# Patient Record
Sex: FEMALE | Race: WHITE
[De-identification: names, ages, dates, MRNs, and addresses within clinical notes are randomized per-mention and may not be internally consistent; named-entity substitution may affect disease eponyms.]

---

## 2020-12-10 ENCOUNTER — HOSPITAL ENCOUNTER (EMERGENCY)
Dept: HOSPITAL 63 - ER | Age: 40
LOS: 1 days | Discharge: HOME | End: 2020-12-11
Payer: SELF-PAY

## 2020-12-10 VITALS — HEIGHT: 66 IN | WEIGHT: 220.02 LBS | BODY MASS INDEX: 35.36 KG/M2

## 2020-12-10 DIAGNOSIS — Z88.0: ICD-10-CM
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
## 2020-12-10 DIAGNOSIS — O26.893: Primary | ICD-10-CM

## 2020-12-10 DIAGNOSIS — Z3A.33: ICD-10-CM

## 2020-12-10 DIAGNOSIS — T78.49XA: ICD-10-CM

## 2020-12-10 DIAGNOSIS — L29.9: ICD-10-CM

## 2020-12-10 LAB
AMPHETAMINE/METHAMPHETAMINE: (no result)
APTT PPP: YELLOW S
BACTERIA #/AREA URNS HPF: 0 /HPF
BARBITURATES UR-MCNC: (no result) UG/ML
BENZODIAZ UR-MCNC: (no result) UG/L
BILIRUB UR QL STRIP: (no result)
CANNABINOIDS UR-MCNC: (no result) UG/L
COCAINE UR-MCNC: (no result) NG/ML
FIBRINOGEN PPP-MCNC: CLEAR MG/DL
GLUCOSE UR STRIP-MCNC: (no result) MG/DL
METHADONE SERPL-MCNC: (no result) NG/ML
NITRITE UR QL STRIP: (no result)
OPIATES UR-MCNC: (no result) NG/ML
PCP SERPL-MCNC: (no result) MG/DL
RBC #/AREA URNS HPF: 0 /HPF (ref 0–2)
SP GR UR STRIP: 1.02
SQUAMOUS #/AREA URNS LPF: (no result) /LPF
UROBILINOGEN UR-MCNC: 0.2 MG/DL
WBC #/AREA URNS HPF: (no result) /HPF (ref 0–4)

## 2020-12-10 PROCEDURE — 36415 COLL VENOUS BLD VENIPUNCTURE: CPT

## 2020-12-10 PROCEDURE — 99284 EMERGENCY DEPT VISIT MOD MDM: CPT

## 2020-12-10 PROCEDURE — 80307 DRUG TEST PRSMV CHEM ANLYZR: CPT

## 2020-12-10 PROCEDURE — 96374 THER/PROPH/DIAG INJ IV PUSH: CPT

## 2020-12-10 PROCEDURE — 87086 URINE CULTURE/COLONY COUNT: CPT

## 2020-12-10 PROCEDURE — 81001 URINALYSIS AUTO W/SCOPE: CPT

## 2020-12-10 PROCEDURE — 94640 AIRWAY INHALATION TREATMENT: CPT

## 2020-12-10 PROCEDURE — 96375 TX/PRO/DX INJ NEW DRUG ADDON: CPT

## 2020-12-10 RX ADMIN — ALBUTEROL SULFATE ONE PUFF: 90 AEROSOL, METERED RESPIRATORY (INHALATION) at 23:00

## 2020-12-10 NOTE — PHYS DOC
General Adult


HPI:


HPI:


".. I am having some kind of allergic reaction.. I itch every where.. this rash 

is all over.. but more on face.. my eyes are swelling... I am 33 weeks 

pregnant.. the only thing new in my diet is 'S[employer]em[employer]"  and " Chinese food 

yesterday..."..





Patient is a 40 year old female who presents with above hx and complaints of 

facial edema and hives.  Pt. 33 weeks gravid.  Patient denies any travel.  

Patient denies specific ill contacts.  Patient denies do flu vaccinations or any

vaccinations.  Patient does not take prenatal vitamins.  Patient has taken some 

Benadryl at home.  Patient denies history of previous food allergies..  The 

patient follows with Dr. Arguelles.





Review of Systems:


Review of Systems:


Constitutional:  Denies fever or chills 


Eyes:  Denies change in visual acuity 


HENT:  Denies nasal congestion or sore throat 


Respiratory:  Denies cough or shortness of breath 


Cardiovascular:  Denies chest pain or edema 


GI:  Denies abdominal pain, nausea, vomiting, bloody stools or diarrhea 


: Denies dysuria 


Musculoskeletal:  Denies back pain or joint pain 


Integument: Complains of generalized hives


Neurologic:  Denies headache, focal weakness or sensory changes 


Endocrine:  Denies polyuria or polydipsia 


Lymphatic:  Denies swollen glands 


Psychiatric:  Denies depression or anxiety





Family History:


Family History:


Noncontributory to presentation





Current Medications:


Current Meds:


See nursing for home meds





Allergies:


Allergies:


Allergic to penicillins





Physical Exam:


PE:





Constitutional: Moderate acute distress, non-toxic appearance. []


HENT: Normocephalic, atraumatic, bilateral external ears normal, oropharynx 

moist, no oral exudates, nose normal. []


Eyes: PERRLA, EOMI, conjunctiva normal, no discharge. [] 


Neck: Normal range of motion, no tenderness, supple, no stridor. [] 


Cardiovascular:Heart rate regular rhythm, no murmur []


Lungs & Thorax:  Bilateral breath sounds clear to auscultation []


Abdomen: Bowel sounds normal, soft, no tenderness, no masses, no pulsatile 

masses.  Gravid.  Fetal heart rate 140s.  Active movement


Skin: Warm, dry, no erythema, hive or entire body] 


Back: No tenderness, no CVA tenderness. [] 


Extremities: No tenderness, no cyanosis, no clubbing, ROM intact, no edema. [] 


Neurologic: Alert and oriented X 3, normal motor function, normal sensory 

function, no focal deficits noted.  DTRs +2 patellar and brachial.


Psychologic: Affect anxious, judgement normal, mood normal. []





EKG:


EKG:


[]





Radiology/Procedures:


Radiology/Procedures:


[]





Heart Score:


Risk Factors:


Risk Factors:  DM, Current or recent (<one month) smoker, HTN, HLP, family 

history of CAD, obesity.


Risk Scores:


Score 0 - 3:  2.5% MACE over next 6 weeks - Discharge Home


Score 4 - 6:  20.3% MACE over next 6 weeks - Admit for Clinical Observation


Score 7 - 10:  72.7% MACE over next 6 weeks - Early Invasive Strategies





Course & Med Decision Making:


Course & Med Decision Making


Pertinent Labs and Imaging studies reviewed. (See chart for details)





Patient will avoid any new intake of foods.  Patient take Benadryl 50 mg up to 4

 times a day for itching.  Patient take Pepcid 20 mg twice a day.  Patient take 

prednisone 50 mg a day for the next 5 days.  Patient use MDI 2 puffs 4 times a 

day.  Patient follow-up with primary care.  Patient follow-up with her OB/GYN.  

Patient return if any concerns.





Impression:





1.  Allergic reaction-


2.  Hives


3.  Gravid 33 weeks





[]





Dragon Disclaimer:


Dragon Disclaimer:


This electronic medical record was generated, in whole or in part, using a voice

 recognition dictation system.





Departure


Departure:


Referrals:  


PCP,NO (PCP)


Scripts


Famotidine (PEPCID) 20 Mg Tablet


20 MG PO BID for hives for 10 Days, #20 TAB


   Prov: MARITA RITCHIE MD         12/11/20 


Prednisone (PREDNISONE) 50 Mg Tablet


50 MG PO DAILY for allergy for 5 Days, #5 TAB


   Prov: MARITA RITCHIE MD         12/11/20





Dragon Disclaimer


This chart was dictated in whole or in part using Voice Recognition software in 

a busy, high-work load, and often noisy Emergency Department environment.  It 

may contain unintended and wholly unrecognized errors or omissions.





Dragon Disclaimer


This chart was dictated in whole or in part using Voice Recognition software in 

a busy, high-work load, and often noisy Emergency Department environment.  It 

may contain unintended and wholly unrecognized errors or omissions.











MARITA RITCHIE MD           Dec 10, 2020 21:49

## 2020-12-11 VITALS — DIASTOLIC BLOOD PRESSURE: 62 MMHG | SYSTOLIC BLOOD PRESSURE: 112 MMHG

## 2020-12-11 RX ADMIN — ALBUTEROL SULFATE ONE PUFF: 90 AEROSOL, METERED RESPIRATORY (INHALATION) at 01:00
